# Patient Record
Sex: FEMALE | Race: WHITE | NOT HISPANIC OR LATINO | URBAN - NONMETROPOLITAN AREA
[De-identification: names, ages, dates, MRNs, and addresses within clinical notes are randomized per-mention and may not be internally consistent; named-entity substitution may affect disease eponyms.]

---

## 2019-08-12 ENCOUNTER — APPOINTMENT (RX ONLY)
Dept: URBAN - NONMETROPOLITAN AREA CLINIC 26 | Facility: CLINIC | Age: 74
Setting detail: DERMATOLOGY
End: 2019-08-12

## 2019-08-12 DIAGNOSIS — L30.9 DERMATITIS, UNSPECIFIED: ICD-10-CM

## 2019-08-12 DIAGNOSIS — I78.8 OTHER DISEASES OF CAPILLARIES: ICD-10-CM

## 2019-08-12 DIAGNOSIS — L71.8 OTHER ROSACEA: ICD-10-CM

## 2019-08-12 DIAGNOSIS — L82.1 OTHER SEBORRHEIC KERATOSIS: ICD-10-CM

## 2019-08-12 PROBLEM — H91.90 UNSPECIFIED HEARING LOSS, UNSPECIFIED EAR: Status: ACTIVE | Noted: 2019-08-12

## 2019-08-12 PROCEDURE — ? COUNSELING

## 2019-08-12 PROCEDURE — ? ADDITIONAL NOTES

## 2019-08-12 PROCEDURE — 99212 OFFICE O/P EST SF 10 MIN: CPT

## 2019-08-12 ASSESSMENT — LOCATION DETAILED DESCRIPTION DERM
LOCATION DETAILED: NASAL DORSUM
LOCATION DETAILED: LEFT CENTRAL MALAR CHEEK
LOCATION DETAILED: LEFT MEDIAL MALAR CHEEK
LOCATION DETAILED: LEFT PROXIMAL DORSAL FOREARM
LOCATION DETAILED: RIGHT MEDIAL MALAR CHEEK
LOCATION DETAILED: RIGHT PROXIMAL DORSAL FOREARM

## 2019-08-12 ASSESSMENT — LOCATION ZONE DERM
LOCATION ZONE: ARM
LOCATION ZONE: FACE
LOCATION ZONE: FACE
LOCATION ZONE: NOSE

## 2019-08-12 ASSESSMENT — LOCATION SIMPLE DESCRIPTION DERM
LOCATION SIMPLE: NOSE
LOCATION SIMPLE: RIGHT CHEEK
LOCATION SIMPLE: LEFT CHEEK
LOCATION SIMPLE: RIGHT FOREARM
LOCATION SIMPLE: LEFT CHEEK
LOCATION SIMPLE: LEFT FOREARM

## 2019-08-12 NOTE — HPI: SKIN LESIONS
How Severe Is Your Skin Lesion?: mild
Have Your Skin Lesions Been Treated?: not been treated
Is This A New Presentation, Or A Follow-Up?: Skin Lesions
Additional History: Lesions seem to be improving. No new skin care products. Unsure of possible exposures outside.

## 2019-08-12 NOTE — PROCEDURE: ADDITIONAL NOTES
Detail Level: Simple
Additional Notes: SHE HAS A SMALL SK AND BACKGROUND TELANGIECTASIA. MORE PROMINENT IN THIS AREA THAN HER BACKGROUND ET ROSACEA. HOWEVER, THERE IS NO SCALE OR SURFACE CHANGE TO INDICATE SOLAR DAMAGE/AK. RECOMMEND IMPROVED SUN PROTECTION AND THAT CAN BE TREATED WITH IPL BUT BENIGN AND NO TREATMENT REQUIRED.
Additional Notes: THESE LESIONS ARE VERY SUGGESTIVE OF A DERMAL HYPERSENSITIVITY REACTION. SHE DOESN'T RECALL ANY BITES OR OTHER CONTACTS, BUT I STRONGLY FAVOR A DHR. HOWEVER, THEY ARE GETTING BETTER. RECOMMEND GIVING MORE TIME TO HEAL AS NOT ITCHY SO WOULD NOT TREAT WITH STEROIDS.

## 2020-01-06 ENCOUNTER — APPOINTMENT (RX ONLY)
Dept: URBAN - NONMETROPOLITAN AREA CLINIC 26 | Facility: CLINIC | Age: 75
Setting detail: DERMATOLOGY
End: 2020-01-06

## 2020-01-06 DIAGNOSIS — L93.1 SUBACUTE CUTANEOUS LUPUS ERYTHEMATOSUS: ICD-10-CM

## 2020-01-06 PROCEDURE — 99212 OFFICE O/P EST SF 10 MIN: CPT

## 2020-01-06 PROCEDURE — ? COUNSELING

## 2020-01-06 PROCEDURE — ? ADDITIONAL NOTES

## 2020-01-06 NOTE — HPI: RASH (SUBACUTE CUTANEOUS LUPUS ERYTHEMATOSUS)
How Severe Is It?: mild
Is This A New Presentation, Or A Follow-Up?: Rash
Additional History: No current rash

## 2020-01-06 NOTE — PROCEDURE: ADDITIONAL NOTES
Additional Notes: PATIENT REPORTS WAS SEEING A DERM IN [place]. WE HAV NOT RECEIVED RECORDS YET. SHE WAS DIAGNOSED WITH SCLE. HAD BIOPSY DONE. SHE HAS LABS WITH HER SHOWING POSITIVE ERWIN AND POSITIVE SSA ANTIBODY. CBC, CMP WITHOUT REMARKABLE CHANGES. SHE REPORTS HAVING A UA DONE THAT WAS UNREMARKABLE. NO MARKERS OF SYSTEMIC LE AT THIS TIME. DISCUSSED NEED FOR REGULAR FOLLOW UP TO MAKE SURE NO DEVELOPMENT OF SLE. SHE WILL CALL IF RASH RECURS. DISCUSSED DILIGENT SUN PROTECTION. \\n\\nSHE REPORTS SHE WAS TOLD SHE HAD FUNGAL SUPERINFECTION AND THEN BACTERIAL INFECTION OF HER SKIN SO HAS MUPIROCIN AND KETOCONAZOLE. SHE WILL PUT THESE AWAY FOR NOW. HAS TRIAMCINOLONE 0.025% FOR IF LESIONS RECUR ON HER FACE OR NECK AND HAS BETAMETHASONE FOR IF LESIONS OCCUR ON HER TRUNK OR EXTREMITIES
Detail Level: Detailed

## 2021-05-20 ENCOUNTER — APPOINTMENT (RX ONLY)
Dept: URBAN - NONMETROPOLITAN AREA CLINIC 26 | Facility: CLINIC | Age: 76
Setting detail: DERMATOLOGY
End: 2021-05-20

## 2021-05-20 DIAGNOSIS — L70.8 OTHER ACNE: ICD-10-CM

## 2021-05-20 DIAGNOSIS — L93.2 OTHER LOCAL LUPUS ERYTHEMATOSUS: ICD-10-CM | Status: INADEQUATELY CONTROLLED

## 2021-05-20 PROCEDURE — ? ADDITIONAL NOTES

## 2021-05-20 PROCEDURE — ? COUNSELING

## 2021-05-20 PROCEDURE — ? PRESCRIPTION

## 2021-05-20 PROCEDURE — 99213 OFFICE O/P EST LOW 20 MIN: CPT

## 2021-05-20 RX ORDER — HYDROXYCHLOROQUINE SULFATE 200 MG/1
TABLET ORAL
Qty: 60 | Refills: 5 | Status: ERX | COMMUNITY
Start: 2021-05-20

## 2021-05-20 RX ADMIN — HYDROXYCHLOROQUINE SULFATE: 200 TABLET ORAL at 00:00

## 2021-05-20 ASSESSMENT — LOCATION SIMPLE DESCRIPTION DERM: LOCATION SIMPLE: CHEST

## 2021-05-20 ASSESSMENT — LOCATION ZONE DERM: LOCATION ZONE: TRUNK

## 2021-05-20 ASSESSMENT — LOCATION DETAILED DESCRIPTION DERM: LOCATION DETAILED: UPPER STERNUM

## 2021-05-20 NOTE — PROCEDURE: ADDITIONAL NOTES
Detail Level: Detailed
Render Risk Assessment In Note?: no
Additional Notes: NEEDS TO SEE EYE DOCTOR EVERY YEAR
Additional Notes: SHE HAD POSITIVE ERWIN AND POSITIVE SSA AND SSB SO PREVIOUSLY CALLED SCLE. HER RASH TODAY IS A MALAR RASH MORE SUGGESTIVE OF SLE. SHE REPORTS SHE RECENTLY HAD LABWORK DONE THROUGH [facility]. WILL REQUEST THOSE LABS TO DETERMINE IF ANY ADDITIONAL LABS NEEDED FOR HER LUPUS EVAL AND FOR HER PLAQUENIL. SHE WAS PREVIOUSLY TAKING PLAQUENIL 400MG/DAY BUT DROPPED DOWN TO TAKING IT JUST ONCE A DAY OF HER OWN ACCORD AND THEN SHE DEVELOPED THE CURRENT MALAR RASH. SHE WILL GO BACK UP TO HER PREVIOUS DOSING. CONTINUE DILIGENT SUN PROTECTION. MAKE SURE CBC, CMP, AND UA WITH MICRO DONE.